# Patient Record
Sex: MALE | Race: WHITE | URBAN - METROPOLITAN AREA
[De-identification: names, ages, dates, MRNs, and addresses within clinical notes are randomized per-mention and may not be internally consistent; named-entity substitution may affect disease eponyms.]

---

## 2021-07-01 NOTE — PROGRESS NOTES
Chief Complaint   Patient presents with    Establish Care         HPI:  Elder Juan is a 41 y.o. (: 1975) here today to establish care. He would like to discuss his previously diagnosed issues: ED and anxiety. ED  Rx: viagra 20-40 mg PRN  Still happening but is stressed. Saw a urologist; low sperm ct and infertile  Testosterone was ok in 2018  3x/week strength training, not a lot of cardio, but does walk    FRANKLIN  Rx: buspar 5 mg TID  Was originally stared Lexapro but worsened ED.  2-3 yrs ago tried Zoloft but SEs (GI and dizziness) were terrible. Stable but feels like he could be better. Still feels bouts of anxiety and irritability  Started it May 2020 and has not followed up. Sleeping alright but goes through phases. Appetite is ok. Silent migraines  Rx: nothing  Chronic but controlled per pt. Review of Systems   Constitutional: Negative for appetite change, chills, diaphoresis, fatigue, fever and unexpected weight change. HENT: Negative for congestion, postnasal drip, rhinorrhea, sinus pressure, sneezing and sore throat. Eyes: Negative for redness, itching and visual disturbance. Respiratory: Negative for cough, chest tightness, shortness of breath and wheezing. Cardiovascular: Negative for chest pain, palpitations and leg swelling. Gastrointestinal: Negative for abdominal pain, blood in stool, constipation, diarrhea, nausea and vomiting. Endocrine: Negative for cold intolerance, heat intolerance, polydipsia and polyuria. Genitourinary: Negative for decreased urine volume and dysuria. Musculoskeletal: Negative for arthralgias, back pain, joint swelling, myalgias and neck pain. Skin: Negative for rash and wound. Allergic/Immunologic: Negative for environmental allergies. Neurological: Negative for dizziness, tremors, syncope, weakness, light-headedness, numbness and headaches. Hematological: Negative for adenopathy. Does not bruise/bleed easily. Psychiatric/Behavioral: Positive for agitation and behavioral problems. Negative for confusion, decreased concentration, self-injury, sleep disturbance and suicidal ideas. The patient is nervous/anxious. Past Medical History:   Diagnosis Date    Anxiety     ED (erectile dysfunction) 2015    Migraines 2018       Family History   Problem Relation Age of Onset    Migraines Father     Hypertension Father        Social History     Tobacco Use    Smoking status: Former Smoker     Years: 8.00     Types: Cigarettes     Quit date: 2018     Years since quitting: 3.5    Smokeless tobacco: Never Used   Substance Use Topics    Alcohol use: Not on file    Drug use: Not on file       New Prescriptions    BUSPIRONE (BUSPAR) 7.5 MG TABLET    Take 1 tablet by mouth 3 times daily       Meds Prior to visit:  Current Outpatient Medications on File Prior to Visit   Medication Sig Dispense Refill    Sildenafil Citrate (VIAGRA PO) Take by mouth       No current facility-administered medications on file prior to visit. No Known Allergies    OBJECTIVE:  /82   Pulse 74   Temp 99.1 °F (37.3 °C) (Temporal)   Resp 16   Ht 5' 11.5\" (1.816 m)   Wt 194 lb (88 kg)   BMI 26.68 kg/m²   BP Readings from Last 2 Encounters:   07/06/21 130/82     Wt Readings from Last 3 Encounters:   07/06/21 194 lb (88 kg)       Physical Exam  Vitals reviewed. Constitutional:       General: He is not in acute distress. Appearance: Normal appearance. He is well-developed and normal weight. He is not ill-appearing. HENT:      Head: Normocephalic and atraumatic. Right Ear: Tympanic membrane, ear canal and external ear normal. There is no impacted cerumen. Left Ear: Tympanic membrane, ear canal and external ear normal. There is no impacted cerumen. Nose: Nose normal. No congestion or rhinorrhea. Mouth/Throat:      Mouth: Mucous membranes are moist.      Pharynx: Oropharynx is clear.  No oropharyngeal exudate or Judgment normal.      Comments:           ASSESSMENT/PLAN:  1. Encounter to establish care  VS reviewed and WNL    BMI reviewed   All questions answered. F/u discussed. Healthy lifestyle modifications discussed. 2. FRANKLIN (generalized anxiety disorder)  Seems like he can do better. Has not titrate up on dose  Will do this and follow up in 4-6 weeks. Also offered [facility] and he will pursue if needed. Update tsh and test  - busPIRone (BUSPAR) 7.5 MG tablet; Take 1 tablet by mouth 3 times daily  Dispense: 90 tablet; Refill: 3  - Testosterone  - TSH with Reflex; Future    3. Other male erectile dysfunction  Will update test level since it was ok in 2018 but he is experiencing irritability and anger much easier  May be increased stress  - Testosterone  - TSH with Reflex; Future    4. Other migraine without status migrainosus, not intractable  No meds  Controlled   Update thyroid function   - TSH with Reflex; Future    Discussed use, benefit, and side effects of prescribed medications. Barriers to medication compliance addressed. All patient questions answered. Pt voiced understanding. RTC Return in about 6 weeks (around 8/17/2021), or if symptoms worsen or fail to improve.     Future Appointments   Date Time Provider Monico Maciel   8/16/2021  8:00 AM MD Romel Salcedo MD  7/6/2021  9:06 AM

## 2021-07-06 ENCOUNTER — OFFICE VISIT (OUTPATIENT)
Dept: PRIMARY CARE CLINIC | Age: 46
End: 2021-07-06
Payer: OTHER GOVERNMENT

## 2021-07-06 VITALS
SYSTOLIC BLOOD PRESSURE: 130 MMHG | TEMPERATURE: 99.1 F | WEIGHT: 194 LBS | HEART RATE: 74 BPM | BODY MASS INDEX: 26.28 KG/M2 | HEIGHT: 72 IN | RESPIRATION RATE: 16 BRPM | DIASTOLIC BLOOD PRESSURE: 82 MMHG

## 2021-07-06 DIAGNOSIS — G43.809 OTHER MIGRAINE WITHOUT STATUS MIGRAINOSUS, NOT INTRACTABLE: ICD-10-CM

## 2021-07-06 DIAGNOSIS — N52.8 OTHER MALE ERECTILE DYSFUNCTION: ICD-10-CM

## 2021-07-06 DIAGNOSIS — F41.1 GAD (GENERALIZED ANXIETY DISORDER): ICD-10-CM

## 2021-07-06 DIAGNOSIS — Z76.89 ENCOUNTER TO ESTABLISH CARE: Primary | ICD-10-CM

## 2021-07-06 LAB — TSH REFLEX: 1.34 UIU/ML (ref 0.27–4.2)

## 2021-07-06 PROCEDURE — 99204 OFFICE O/P NEW MOD 45 MIN: CPT | Performed by: FAMILY MEDICINE

## 2021-07-06 RX ORDER — BUSPIRONE HYDROCHLORIDE 7.5 MG/1
7.5 TABLET ORAL 3 TIMES DAILY
Qty: 90 TABLET | Refills: 3 | Status: SHIPPED | OUTPATIENT
Start: 2021-07-06 | End: 2021-11-23

## 2021-07-06 RX ORDER — BUSPIRONE HYDROCHLORIDE 15 MG/1
15 TABLET ORAL 3 TIMES DAILY
COMMUNITY
End: 2021-07-06

## 2021-07-06 ASSESSMENT — PATIENT HEALTH QUESTIONNAIRE - PHQ9
2. FEELING DOWN, DEPRESSED OR HOPELESS: 0
SUM OF ALL RESPONSES TO PHQ QUESTIONS 1-9: 0
1. LITTLE INTEREST OR PLEASURE IN DOING THINGS: 0
DEPRESSION UNABLE TO ASSESS: FUNCTIONAL CAPACITY MOTIVATION LIMITS ACCURACY
SUM OF ALL RESPONSES TO PHQ QUESTIONS 1-9: 0
SUM OF ALL RESPONSES TO PHQ QUESTIONS 1-9: 0
SUM OF ALL RESPONSES TO PHQ9 QUESTIONS 1 & 2: 0

## 2021-07-06 ASSESSMENT — ENCOUNTER SYMPTOMS
CONSTIPATION: 0
EYE ITCHING: 0
ABDOMINAL PAIN: 0
SINUS PRESSURE: 0
EYE REDNESS: 0
VOMITING: 0
BLOOD IN STOOL: 0
WHEEZING: 0
SORE THROAT: 0
BACK PAIN: 0
COUGH: 0
NAUSEA: 0
RHINORRHEA: 0
SHORTNESS OF BREATH: 0
CHEST TIGHTNESS: 0
DIARRHEA: 0

## 2021-07-08 LAB — TESTOSTERONE TOTAL: 307 NG/DL (ref 220–1000)

## 2021-07-14 DIAGNOSIS — R68.82 LIBIDO, DECREASED: ICD-10-CM

## 2021-07-14 DIAGNOSIS — N52.8 OTHER MALE ERECTILE DYSFUNCTION: Primary | ICD-10-CM

## 2021-08-14 NOTE — PROGRESS NOTES
Chief Complaint   Patient presents with    Anxiety         HPI:  Yasmeen Harry is a 41 y.o. (: 1975) here today for FRANKLIN. LOV increased buspar. Since then he states he believes it is making his anxiety better but he is not sure. He is fine continuing this dose and working on his anxiety by having reasonable expectations. No interest in [facility] at this time. ED  Checked testosterone LOV and it was normal.   Uro referral placed per pt request LOV  Has an appt with uro next week. Currently taking viagra and this helps    Vivid dreams  Happening about every 2 weeks with the increase in buspar. He has researched this in depth online and feels that it is likely due to the medication. He doesn't think it is affecting him enough to stop the medication however. Brought in his lab results from work physical.   LDL and total cholesterol are mildly elevated. Glucose at 102. Discussed diabetes screen and he politely declined. Talked about animal proteins and veggies. Review of Systems   Constitutional: Negative for activity change, chills, fatigue and fever. HENT: Negative for congestion. Eyes: Negative for redness. Respiratory: Negative for cough, chest tightness, shortness of breath and wheezing. Cardiovascular: Negative for chest pain and palpitations. Gastrointestinal: Negative for abdominal pain and diarrhea. Genitourinary: Negative for difficulty urinating. Musculoskeletal: Negative for arthralgias. Skin: Negative for rash. Allergic/Immunologic: Negative for immunocompromised state. Neurological: Negative for dizziness, light-headedness and headaches. Hematological: Negative for adenopathy. Psychiatric/Behavioral: Negative for behavioral problems.        Past Medical History:   Diagnosis Date    Anxiety     ED (erectile dysfunction)     Migraines 2018       Family History   Problem Relation Age of Onset    Migraines Father     Hypertension Father Social History     Tobacco Use    Smoking status: Former Smoker     Years: 8.00     Types: Cigarettes     Quit date: 2018     Years since quitting: 3.6    Smokeless tobacco: Never Used   Substance Use Topics    Alcohol use: Not on file    Drug use: Not on file       New Prescriptions    No medications on file       Meds Prior to visit:  Current Outpatient Medications on File Prior to Visit   Medication Sig Dispense Refill    busPIRone (BUSPAR) 7.5 MG tablet Take 7.5 mg by mouth 3 times daily      Sildenafil Citrate (VIAGRA PO) Take by mouth       No current facility-administered medications on file prior to visit. No Known Allergies    OBJECTIVE:  /79   Pulse 56   Temp 97.8 °F (36.6 °C) (Temporal)   Resp 16   Wt 195 lb 9.6 oz (88.7 kg)   BMI 26.90 kg/m²   BP Readings from Last 2 Encounters:   08/16/21 129/79   07/06/21 130/82     Wt Readings from Last 3 Encounters:   08/16/21 195 lb 9.6 oz (88.7 kg)   07/06/21 194 lb (88 kg)       Physical Exam  Vitals and nursing note reviewed. Constitutional:       General: He is not in acute distress. Appearance: Normal appearance. HENT:      Head: Normocephalic and atraumatic. Right Ear: Tympanic membrane, ear canal and external ear normal. There is no impacted cerumen. Left Ear: Tympanic membrane, ear canal and external ear normal. There is no impacted cerumen. Nose: Nose normal.      Mouth/Throat:      Mouth: Mucous membranes are moist.      Pharynx: Oropharynx is clear. Eyes:      Extraocular Movements: Extraocular movements intact. Conjunctiva/sclera: Conjunctivae normal.      Pupils: Pupils are equal, round, and reactive to light. Cardiovascular:      Rate and Rhythm: Normal rate and regular rhythm. Pulses: Normal pulses. Heart sounds: Normal heart sounds. Pulmonary:      Effort: Pulmonary effort is normal. No respiratory distress. Breath sounds: Normal breath sounds. No wheezing.    Abdominal: General: Bowel sounds are normal.   Musculoskeletal:         General: No signs of injury. Normal range of motion. Cervical back: Normal range of motion. Skin:     General: Skin is warm. Capillary Refill: Capillary refill takes less than 2 seconds. Findings: No erythema. Neurological:      General: No focal deficit present. Mental Status: He is alert and oriented to person, place, and time. Mental status is at baseline. Psychiatric:         Mood and Affect: Mood normal.         Behavior: Behavior normal.         Thought Content: Thought content normal.         Judgment: Judgment normal.           ASSESSMENT/PLAN:  1. FRANKLIN (generalized anxiety disorder)  Continue buspar for now  Will follow up in 4-6 weeks to gauge any more improvement or worsening of dreams if the med is the cause  No new life stressors    2. Other male erectile dysfunction  Has uro follow up next week  Testosterone is normal  Libido is still decreased but he feel that his anxiety had always caused this versus the medication  Consider wellbutrin addition in the future    3. Vivid dream  Stable and will continue to monitor for patient'follow up in 4-6 weeks to gauge improvement  Not interested in [facility] at this time    4. Mixed hyperlipidemia  Per outside labs now in media  Discussed dietary mods to help make these numbers better  Will work on increasing activity 3 times a week for 30 minutes. I have spent 30 minutes of face to face time with the patient with more than 50%  spent counseling , educating and coordinating care for HLD, FRANKLIN, ED and dreams. Discussed use, benefit, and side effects of prescribed medications. Barriers to medication compliance addressed. All patient questions answered. Pt voiced understanding. RTC Return in about 3 months (around 11/16/2021).     Future Appointments   Date Time Provider Monico Maciel   11/16/2021  8:00 AM MD KATE Null Susan Flores MD  8/16/2021  9:55 AM

## 2021-08-16 ENCOUNTER — OFFICE VISIT (OUTPATIENT)
Dept: PRIMARY CARE CLINIC | Age: 46
End: 2021-08-16
Payer: OTHER GOVERNMENT

## 2021-08-16 VITALS
SYSTOLIC BLOOD PRESSURE: 129 MMHG | DIASTOLIC BLOOD PRESSURE: 79 MMHG | TEMPERATURE: 97.8 F | WEIGHT: 195.6 LBS | RESPIRATION RATE: 16 BRPM | HEART RATE: 56 BPM | BODY MASS INDEX: 26.9 KG/M2

## 2021-08-16 DIAGNOSIS — R68.89 VIVID DREAM: ICD-10-CM

## 2021-08-16 DIAGNOSIS — N52.8 OTHER MALE ERECTILE DYSFUNCTION: ICD-10-CM

## 2021-08-16 DIAGNOSIS — F41.1 GAD (GENERALIZED ANXIETY DISORDER): Primary | ICD-10-CM

## 2021-08-16 DIAGNOSIS — E78.2 MIXED HYPERLIPIDEMIA: ICD-10-CM

## 2021-08-16 PROCEDURE — 99214 OFFICE O/P EST MOD 30 MIN: CPT | Performed by: FAMILY MEDICINE

## 2021-08-16 RX ORDER — BUSPIRONE HYDROCHLORIDE 7.5 MG/1
7.5 TABLET ORAL 3 TIMES DAILY
COMMUNITY
End: 2021-11-16

## 2021-08-16 ASSESSMENT — ENCOUNTER SYMPTOMS
WHEEZING: 0
SHORTNESS OF BREATH: 0
COUGH: 0
DIARRHEA: 0
CHEST TIGHTNESS: 0
EYE REDNESS: 0
ABDOMINAL PAIN: 0

## 2021-09-07 ENCOUNTER — OFFICE VISIT (OUTPATIENT)
Dept: PRIMARY CARE CLINIC | Age: 46
End: 2021-09-07
Payer: OTHER GOVERNMENT

## 2021-09-07 VITALS
HEART RATE: 61 BPM | TEMPERATURE: 98.1 F | SYSTOLIC BLOOD PRESSURE: 133 MMHG | BODY MASS INDEX: 26.82 KG/M2 | DIASTOLIC BLOOD PRESSURE: 84 MMHG | WEIGHT: 195 LBS

## 2021-09-07 DIAGNOSIS — K11.5 SALIVARY STONE: Primary | ICD-10-CM

## 2021-09-07 PROCEDURE — 99213 OFFICE O/P EST LOW 20 MIN: CPT | Performed by: STUDENT IN AN ORGANIZED HEALTH CARE EDUCATION/TRAINING PROGRAM

## 2021-09-07 ASSESSMENT — ENCOUNTER SYMPTOMS
NAUSEA: 0
COUGH: 0
SORE THROAT: 0
TROUBLE SWALLOWING: 0
CHOKING: 0

## 2021-09-07 NOTE — PROGRESS NOTES
[facility]  Acute visit   2021    Rohan Jones (:  1975) is a 41 y.o. male, here for evaluation of the following medical concerns:    Chief Complaint   Patient presents with    Other     under tounge swollen           ASSESSMENT/ PLAN:  1. Salivary stone  No infected, will watch and wait   Consider ENT referral if does not resolve  Recommended sucking on hard candy, hydration   Monitor s/s infection     Return if symptoms worsen or fail to improve. HPI  Hard lump under tongue for the past 4 weeks. Notes possible dehydration at onset. No medication changes. Not painful, resolving overall     ROS  Review of Systems   Constitutional: Negative for appetite change and fever. HENT: Negative for dental problem, drooling, mouth sores, sore throat and trouble swallowing. Respiratory: Negative for cough and choking. Gastrointestinal: Negative for nausea. Skin: Negative for rash and wound. HISTORIES  Current Outpatient Medications on File Prior to Visit   Medication Sig Dispense Refill    busPIRone (BUSPAR) 7.5 MG tablet Take 7.5 mg by mouth 3 times daily      Sildenafil Citrate (VIAGRA PO) Take by mouth       No current facility-administered medications on file prior to visit. No Known Allergies  Past Medical History:   Diagnosis Date    Anxiety     ED (erectile dysfunction) 2015    Migraines      There is no problem list on file for this patient.     Past Surgical History:   Procedure Laterality Date    CIRCUMCISION  2012    WISDOM TOOTH EXTRACTION       Social History     Socioeconomic History    Marital status:      Spouse name: Not on file    Number of children: Not on file    Years of education: Not on file    Highest education level: Not on file   Occupational History    Not on file   Tobacco Use    Smoking status: Former Smoker     Years: 8.00     Types: Cigarettes     Quit date: 2018     Years since quitting: 3.6    Smokeless tobacco: Never Used   Substance and Sexual Activity    Alcohol use: Not on file    Drug use: Not on file    Sexual activity: Not on file   Other Topics Concern    Not on file   Social History Narrative    Not on file     Social Determinants of Health     Financial Resource Strain:     Difficulty of Paying Living Expenses:    Food Insecurity:     Worried About Running Out of Food in the Last Year:     [address] in the Last Year:    Transportation Needs:     Lack of Transportation (Medical):  Lack of Transportation (Non-Medical):    Physical Activity:     Days of Exercise per Week:     Minutes of Exercise per Session:    Stress:     Feeling of Stress :    Social Connections:     Frequency of Communication with Friends and Family:     Frequency of Social Gatherings with Friends and Family:     Attends Faith Services:     Active Member of Clubs or Organizations:     Attends Club or Organization Meetings:     Marital Status:    Intimate Partner Violence:     Fear of Current or Ex-Partner:     Emotionally Abused:     Physically Abused:     Sexually Abused:       Family History   Problem Relation Age of Onset    Migraines Father     Hypertension Father        PE  Vitals:    09/07/21 0907   BP: 133/84   Pulse: 61   Temp: 98.1 °F (36.7 °C)   TempSrc: Oral   Weight: 195 lb (88.5 kg)     Estimated body mass index is 26.82 kg/m² as calculated from the following:    Height as of 7/6/21: 5' 11.5\" (1.816 m). Weight as of this encounter: 195 lb (88.5 kg). Physical Exam  Vitals and nursing note reviewed. Constitutional:       General: He is not in acute distress. Appearance: Normal appearance. HENT:      Nose: Nose normal.      Mouth/Throat:      Mouth: Mucous membranes are moist.      Pharynx: Oropharynx is clear. Comments: Firm 1 cm nodule within salivary gland, mobile.  No swelling, non -tender, no discharge   Eyes:      Conjunctiva/sclera: Conjunctivae normal.      Pupils: Pupils are equal, round, and reactive to light. Musculoskeletal:      Cervical back: Normal range of motion. Lymphadenopathy:      Cervical: No cervical adenopathy. Neurological:      Mental Status: He is alert. Psychiatric:         Mood and Affect: Mood normal.         Behavior: Behavior normal.             Melly Martinez DO    This dictation was generated by voice recognition computer software. Although all attempts are made to edit the dictation for accuracy, there may be errors in the transcription that are not intended.

## 2021-09-07 NOTE — PATIENT INSTRUCTIONS
Patient Education        Salivary Gland Stone: Care Instructions  Your Care Instructions     Salivary glands make saliva, or spit. A salivary gland stone is a piece of hard material, usually calcium, that can form in any of the three main salivary glands in the mouth. Salivary gland stones are also called salivary duct stones. Stones form most often in the gland that releases saliva below the tongue. A stone can block saliva from flowing out of the gland. When saliva backs up behind the stone, it can make the gland swell. The gland swells while you are eating, and then the swelling goes down slowly afterward. The swelling and pain may be under the jaw or in the area in front of the ear, depending on which gland is affected. Your doctor will ask you about your symptoms. He or she may be able to see and feel the gland under your skin or in the floor of your mouth. You may get an imaging test, such as a CT scan or ultrasound. This will help your doctor know if you have a stone and not some other problem. Most stones come out into the mouth on their own. While the stone is in the gland, your doctor may have you take medicine for pain. There are also some things you can do at home to help move the stone. If the stone in your gland hasn't come out within a few weeks, your doctor will discuss treatment options with you. Follow-up care is a key part of your treatment and safety. Be sure to make and go to all appointments, and call your doctor if you are having problems. It's also a good idea to know your test results and keep a list of the medicines you take. How can you care for yourself at home? · Be safe with medicines. Read and follow all instructions on the label. ? If the doctor gave you a prescription medicine for pain, take it as prescribed. ? If you are not taking a prescription pain medicine, ask your doctor if you can take an over-the-counter medicine.   · If your doctor prescribed antibiotics, take them as directed. Do not stop taking them just because you feel better. You need to take the full course of antibiotics. · Use sugar-free gum or candies such as lemon drops, or suck on a lemon wedge. They increase saliva, which may help push the stone out. · Gently massage the affected gland to help move the stone. When should you call for help? Call your doctor now or seek immediate medical care if:    · You have symptoms of infection, such as:  ? Increased pain, swelling, warmth, or redness. ? Red streaks leading from the salivary gland. ? Pus draining from the area where the saliva comes out into the mouth. ? A fever. Watch closely for changes in your health, and be sure to contact your doctor if:    · You do not get better as expected. Where can you learn more? Go to [email or web address]://[email or web address].[email or web address]. org and sign in to your [employer] account. Enter C045 in the Role[employer] box to learn more about \"Salivary Gland Stone: Care Instructions. \"     If you do not have an account, please click on the \"Sign Up Now\" link. Current as of: December 2, 2020               Content Version: 12.9  © 3664-5105 Healthwise, Incorporated. Care instructions adapted under license by [facility] ([facility]). If you have questions about a medical condition or this instruction, always ask your healthcare professional. [address] any warranty or liability for your use of this information.

## 2021-11-15 NOTE — PROGRESS NOTES
Chief Complaint   Patient presents with    Anxiety         ASSESSMENT/PLAN:  1. FRANKLIN (generalized anxiety disorder)  Updated FRANKLIN and interpreted  Patient feels that he is not controlled on BuSpar and would like to try something else  Also feeling quite groggy on it  We will start Wellbutrin  mg daily and follow-up in 4 to 6 weeks to gauge improvement  Politely declines behavioral health at this time  - [place] BEHAV ASSMT W/SCORE & DOCD/STAND INSTRUMENT  - buPROPion (WELLBUTRIN XL) 150 MG extended release tablet; Take 1 tablet by mouth every morning  Dispense: 30 tablet; Refill: 3    2. Other male erectile dysfunction  Following with urology  Continue testosterone    3. Vivid dream  Seems to believe this is caused by BuSpar  We will stop BuSpar and start Wellbutrin  Follow-up in 4 to 6 weeks to gauge improvement  Offered trazodone but politely declines  May try in the future         HPI:  Dell Ortiz is a 41 y.o. (: 1975) here today for follow up of anxiety and erectile dysfunction. Buspar is not working for him and causing grogginess. Tried Zoloft and had horrible diarrhea  Tried Lexapro decreased his libido    Open to trying something new. Very worried about his relationship with his wife and libido. Also having vivid dream, waking up screaming a couple times a month. FRANKLIN 7 SCORE 2021   FRANKLIN-7 Total Score 9   Interpretation of RFANKLIN-7 score: 5-9 = mild anxiety, 10-14 = moderate anxiety, 15+ = severe anxiety. Recommend referral to behavioral health for scores 10 or greater. On topical T for decreased libido and doing ok. Following with urology group. Review of Systems   Constitutional: Negative for activity change, chills, fatigue and fever. HENT: Negative for congestion. Eyes: Negative for redness. Respiratory: Negative for cough, chest tightness, shortness of breath and wheezing. Cardiovascular: Negative for chest pain and palpitations.    Gastrointestinal: Negative for abdominal pain and diarrhea. Genitourinary: Negative for difficulty urinating. Musculoskeletal: Negative for arthralgias. Skin: Negative for rash. Allergic/Immunologic: Negative for immunocompromised state. Neurological: Negative for dizziness, light-headedness and headaches. Hematological: Negative for adenopathy. Psychiatric/Behavioral: Positive for sleep disturbance. Negative for behavioral problems. The patient is nervous/anxious. Past Medical History:   Diagnosis Date    Anxiety     ED (erectile dysfunction) 2015    Migraines 2018       Family History   Problem Relation Age of Onset    Migraines Father     Hypertension Father        Social History     Tobacco Use    Smoking status: Former Smoker     Years: 8.00     Types: Cigarettes     Quit date: 2018     Years since quitting: 3.8    Smokeless tobacco: Never Used   Substance Use Topics    Alcohol use: Not on file    Drug use: Not on file       New Prescriptions    BUPROPION (WELLBUTRIN XL) 150 MG EXTENDED RELEASE TABLET    Take 1 tablet by mouth every morning       Meds Prior to visit:  Current Outpatient Medications on File Prior to Visit   Medication Sig Dispense Refill    Sildenafil Citrate (VIAGRA PO) Take by mouth      testosterone (ANDROGEL; TESTIM) 50 MG/5GM (1%) GEL 1% gel Apply TWO milliliters TO underarm, upper arm, OR shoulder ONCE daily AFTER showering. [facility] immediately AFTER applying. No current facility-administered medications on file prior to visit. No Known Allergies    OBJECTIVE:  /83   Pulse 59   Temp 98.1 °F (36.7 °C) (Temporal)   Resp 16   Wt 198 lb 6.4 oz (90 kg)   BMI 27.29 kg/m²   BP Readings from Last 2 Encounters:   11/16/21 135/83   09/07/21 133/84     Wt Readings from Last 3 Encounters:   11/16/21 198 lb 6.4 oz (90 kg)   09/07/21 195 lb (88.5 kg)   08/16/21 195 lb 9.6 oz (88.7 kg)       Physical Exam  Vitals and nursing note reviewed.    Constitutional:       General: He

## 2021-11-16 ENCOUNTER — OFFICE VISIT (OUTPATIENT)
Dept: PRIMARY CARE CLINIC | Age: 46
End: 2021-11-16
Payer: OTHER GOVERNMENT

## 2021-11-16 VITALS
BODY MASS INDEX: 27.29 KG/M2 | TEMPERATURE: 98.1 F | SYSTOLIC BLOOD PRESSURE: 135 MMHG | DIASTOLIC BLOOD PRESSURE: 83 MMHG | HEART RATE: 59 BPM | WEIGHT: 198.4 LBS | RESPIRATION RATE: 16 BRPM

## 2021-11-16 DIAGNOSIS — F41.1 GAD (GENERALIZED ANXIETY DISORDER): Primary | ICD-10-CM

## 2021-11-16 DIAGNOSIS — N52.8 OTHER MALE ERECTILE DYSFUNCTION: ICD-10-CM

## 2021-11-16 DIAGNOSIS — R68.89 VIVID DREAM: ICD-10-CM

## 2021-11-16 PROCEDURE — 99214 OFFICE O/P EST MOD 30 MIN: CPT | Performed by: FAMILY MEDICINE

## 2021-11-16 PROCEDURE — 96127 BRIEF EMOTIONAL/BEHAV ASSMT: CPT | Performed by: FAMILY MEDICINE

## 2021-11-16 RX ORDER — TESTOSTERONE GEL, 1% 10 MG/G
GEL TRANSDERMAL
COMMUNITY
Start: 2021-10-19

## 2021-11-16 RX ORDER — BUPROPION HYDROCHLORIDE 150 MG/1
150 TABLET ORAL EVERY MORNING
Qty: 30 TABLET | Refills: 3 | Status: SHIPPED | OUTPATIENT
Start: 2021-11-16 | End: 2021-12-08

## 2021-11-16 SDOH — ECONOMIC STABILITY: FOOD INSECURITY: WITHIN THE PAST 12 MONTHS, THE FOOD YOU BOUGHT JUST DIDN'T LAST AND YOU DIDN'T HAVE MONEY TO GET MORE.: NEVER TRUE

## 2021-11-16 SDOH — ECONOMIC STABILITY: FOOD INSECURITY: WITHIN THE PAST 12 MONTHS, YOU WORRIED THAT YOUR FOOD WOULD RUN OUT BEFORE YOU GOT MONEY TO BUY MORE.: NEVER TRUE

## 2021-11-16 ASSESSMENT — ANXIETY QUESTIONNAIRES
6. BECOMING EASILY ANNOYED OR IRRITABLE: 2-OVER HALF THE DAYS
5. BEING SO RESTLESS THAT IT IS HARD TO SIT STILL: 1-SEVERAL DAYS
2. NOT BEING ABLE TO STOP OR CONTROL WORRYING: 1-SEVERAL DAYS
1. FEELING NERVOUS, ANXIOUS, OR ON EDGE: 2-OVER HALF THE DAYS
4. TROUBLE RELAXING: 1-SEVERAL DAYS
7. FEELING AFRAID AS IF SOMETHING AWFUL MIGHT HAPPEN: 1-SEVERAL DAYS
GAD7 TOTAL SCORE: 9
3. WORRYING TOO MUCH ABOUT DIFFERENT THINGS: 1-SEVERAL DAYS

## 2021-11-16 ASSESSMENT — ENCOUNTER SYMPTOMS
DIARRHEA: 0
CHEST TIGHTNESS: 0
SHORTNESS OF BREATH: 0
ABDOMINAL PAIN: 0
WHEEZING: 0
EYE REDNESS: 0
COUGH: 0

## 2021-11-16 ASSESSMENT — SOCIAL DETERMINANTS OF HEALTH (SDOH): HOW HARD IS IT FOR YOU TO PAY FOR THE VERY BASICS LIKE FOOD, HOUSING, MEDICAL CARE, AND HEATING?: NOT HARD AT ALL

## 2021-11-23 DIAGNOSIS — F41.1 GAD (GENERALIZED ANXIETY DISORDER): ICD-10-CM

## 2021-11-23 RX ORDER — BUSPIRONE HYDROCHLORIDE 7.5 MG/1
TABLET ORAL
Qty: 270 TABLET | Refills: 1 | Status: SHIPPED | OUTPATIENT
Start: 2021-11-23 | End: 2022-01-28

## 2021-11-23 NOTE — TELEPHONE ENCOUNTER
Medication:   Requested Prescriptions     Pending Prescriptions Disp Refills    busPIRone (BUSPAR) 7.5 MG tablet [Pharmacy Med Name: BUSPIRONE HCL 7.5 MG TABLET] 270 tablet 1     Sig: TAKE 1 TABLET BY MOUTH THREE TIMES A DAY        Last Filled:      Patient Phone Number: [phone] (home)     Last appt: 11/16/2021   Next appt: 12/15/2021    Last OARRS: No flowsheet data found.

## 2021-12-08 DIAGNOSIS — F41.1 GAD (GENERALIZED ANXIETY DISORDER): ICD-10-CM

## 2021-12-08 RX ORDER — BUPROPION HYDROCHLORIDE 150 MG/1
TABLET ORAL
Qty: 30 TABLET | Refills: 3 | Status: SHIPPED | OUTPATIENT
Start: 2021-12-08 | End: 2022-01-28 | Stop reason: SDUPTHER

## 2021-12-08 NOTE — TELEPHONE ENCOUNTER
Medication:   Requested Prescriptions     Pending Prescriptions Disp Refills    buPROPion (WELLBUTRIN XL) 150 MG extended release tablet [Pharmacy Med Name: BUPROPION HCL  MG TABLET] 30 tablet 3     Sig: TAKE 1 TABLET BY MOUTH EVERY DAY IN THE MORNING        Last Filled:      Patient Phone Number: [phone] (home)     Last appt: 11/16/2021   Next appt: 12/15/2021    Last OARRS: No flowsheet data found.

## 2022-01-28 ENCOUNTER — OFFICE VISIT (OUTPATIENT)
Dept: PRIMARY CARE CLINIC | Age: 47
End: 2022-01-28
Payer: COMMERCIAL

## 2022-01-28 VITALS
SYSTOLIC BLOOD PRESSURE: 136 MMHG | RESPIRATION RATE: 16 BRPM | TEMPERATURE: 99.1 F | DIASTOLIC BLOOD PRESSURE: 87 MMHG | WEIGHT: 200.8 LBS | HEART RATE: 94 BPM | BODY MASS INDEX: 27.62 KG/M2

## 2022-01-28 DIAGNOSIS — Z13.220 LIPID SCREENING: ICD-10-CM

## 2022-01-28 DIAGNOSIS — R68.89 VIVID DREAM: ICD-10-CM

## 2022-01-28 DIAGNOSIS — Z13.1 DIABETES MELLITUS SCREENING: ICD-10-CM

## 2022-01-28 DIAGNOSIS — G43.809 OTHER MIGRAINE WITHOUT STATUS MIGRAINOSUS, NOT INTRACTABLE: ICD-10-CM

## 2022-01-28 DIAGNOSIS — F41.1 GAD (GENERALIZED ANXIETY DISORDER): Primary | ICD-10-CM

## 2022-01-28 DIAGNOSIS — N52.8 OTHER MALE ERECTILE DYSFUNCTION: ICD-10-CM

## 2022-01-28 PROCEDURE — 96127 BRIEF EMOTIONAL/BEHAV ASSMT: CPT | Performed by: FAMILY MEDICINE

## 2022-01-28 PROCEDURE — 99214 OFFICE O/P EST MOD 30 MIN: CPT | Performed by: FAMILY MEDICINE

## 2022-01-28 RX ORDER — BUPROPION HYDROCHLORIDE 300 MG/1
TABLET ORAL
Qty: 90 TABLET | Refills: 3 | Status: SHIPPED | OUTPATIENT
Start: 2022-01-28

## 2022-01-28 ASSESSMENT — ENCOUNTER SYMPTOMS
EYE REDNESS: 0
DIARRHEA: 0
ABDOMINAL PAIN: 0
COUGH: 0
SHORTNESS OF BREATH: 0
WHEEZING: 0
CHEST TIGHTNESS: 0

## 2022-01-28 ASSESSMENT — ANXIETY QUESTIONNAIRES
1. FEELING NERVOUS, ANXIOUS, OR ON EDGE: 1
2. NOT BEING ABLE TO STOP OR CONTROL WORRYING: 0
7. FEELING AFRAID AS IF SOMETHING AWFUL MIGHT HAPPEN: 0
3. WORRYING TOO MUCH ABOUT DIFFERENT THINGS: 1
GAD7 TOTAL SCORE: 4
4. TROUBLE RELAXING: 0
5. BEING SO RESTLESS THAT IT IS HARD TO SIT STILL: 0
IF YOU CHECKED OFF ANY PROBLEMS ON THIS QUESTIONNAIRE, HOW DIFFICULT HAVE THESE PROBLEMS MADE IT FOR YOU TO DO YOUR WORK, TAKE CARE OF THINGS AT HOME, OR GET ALONG WITH OTHER PEOPLE: SOMEWHAT DIFFICULT
6. BECOMING EASILY ANNOYED OR IRRITABLE: 2

## 2022-01-28 NOTE — PROGRESS NOTES
Chief Complaint   Patient presents with    Follow-up    Medication Check         ASSESSMENT/PLAN:  1. FRANKLIN (generalized anxiety disorder)  Updated FRANKLIN and interpreted  Patient feels that he more controlled on Wellbutrin and would like to try an increased dose. Mentioned ringing in his ears but this has resolved. Follow up in 4 to 6 weeks to gauge improvement  Politely declines behavioral health at this time  Update labs and rule thyroid abnormality  - MT BEHAV ASSMT W/SCORE & DOCD/STAND INSTRUMENT  - buPROPion (WELLBUTRIN XL) 300 MG extended release tablet; TAKE 1 TABLET BY MOUTH EVERY DAY IN THE MORNING  Dispense: 90 tablet; Refill: 3  - CBC Auto Differential; Future  - Comprehensive Metabolic Panel; Future  - TSH with Reflex; Future    2. Other male erectile dysfunction  Following with urology  Continue testosterone  They are following levels   States that his libido is a little better. - CBC Auto Differential; Future  - Comprehensive Metabolic Panel; Future  - TSH with Reflex; Future    3. Vivid dream  Continue wellbutrin but at increased dose. Doing better. Dreams are much less frequent  Follow-up in 4 to 6 weeks to gauge improvement    4. Other migraine without status migrainosus, not intractable  Migraines are less severe and less frequent  He believes it is due to testosterone  Will keep me posted   Follow up in 4-6 weeks to gauge improvement    5. Diabetes mellitus screening  Update and treat accordingly  - HEMOGLOBIN A1C; Future    6. Lipid screening  Update and treat accordingly  He is concerned about his ASCVD risk and would like me to risk stratify  - Lipid Panel; Future         HPI:  Eduard Brantley is a 40 y.o. (: 1975) here today for mgmt of chronic issues and blood work. Tried Zoloft and had horrible diarrhea  Tried Lexapro decreased his libido  Doing better on wellbutrin but open to increasing it.     FRANKLIN 7 SCORE 2021   FRANKLIN-7 Total Score 4 -   FRANKLIN-7 Total Score - 9 Problem: Falls - Risk of:  Goal: Will remain free from falls  Description: Will remain free from falls  8/7/2021 0843 by Zina Peck RN  Outcome: Ongoing     Problem: Falls - Risk of:  Goal: Absence of physical injury  Description: Absence of physical injury  8/7/2021 0843 by Zina Peck RN  Outcome: Ongoing     Problem: Discharge Planning:  Goal: Discharged to appropriate level of care  Description: Discharged to appropriate level of care  8/7/2021 0843 by Zina Peck RN  Outcome: Ongoing     Problem: Fluid Volume - Imbalance:  Goal: Will remain free of signs and symptoms of dehydration  Description: Will remain free of signs and symptoms of dehydration  8/7/2021 0843 by Zina Peck RN  Outcome: Ongoing     Problem: Fluid Volume - Imbalance:  Goal: Absence of imbalanced fluid volume signs and symptoms  Description: Absence of imbalanced fluid volume signs and symptoms  8/7/2021 0843 by Zina Peck RN  Outcome: Ongoing     Problem: Breathing Pattern - Ineffective  Goal: Ability to achieve and maintain a regular respiratory rate  8/7/2021 0843 by Zina Peck RN  Outcome: Ongoing     Problem: Body Temperature -  Risk of, Imbalanced  Goal: Ability to maintain a body temperature within defined limits  8/7/2021 0843 by Zina Peck RN  Outcome: Ongoing     Problem: Body Temperature -  Risk of, Imbalanced  Goal: Will regain or maintain usual level of consciousness  8/7/2021 0843 by Zina Peck RN  Outcome: Ongoing     Problem:  Body Temperature -  Risk of, Imbalanced  Goal: Complications related to the disease process, condition or treatment will be avoided or minimized  8/7/2021 0843 by Zina Peck RN  Outcome: Ongoing     Problem: Isolation Precautions - Risk of Spread of Infection  Goal: Prevent transmission of infection  8/7/2021 0843 by Zina Peck RN  Outcome: Ongoing     Problem: Loneliness or Risk for Loneliness  Goal: Demonstrate positive use of time alone when Interpretation of FRANKLIN-7 score: 5-9 = mild anxiety, 10-14 = moderate anxiety, 15+ = severe anxiety. Recommend referral to behavioral health for scores 10 or greater. Still worried about his relationship with his wife and libido which is a bit better on topical T. Following with urology group. Vivid dreams are not as frequent or severe. Neither are his migraines. Would like his blood work updated and ASCVD risk calculated. He looked up his risk at home and it made him very nervous. Review of Systems   Constitutional: Negative for activity change, chills, fatigue and fever. HENT: Negative for congestion. Eyes: Negative for redness. Respiratory: Negative for cough, chest tightness, shortness of breath and wheezing. Cardiovascular: Negative for chest pain and palpitations. Gastrointestinal: Negative for abdominal pain and diarrhea. Genitourinary: Negative for difficulty urinating. Musculoskeletal: Negative for arthralgias. Skin: Negative for rash. Allergic/Immunologic: Negative for immunocompromised state. Neurological: Negative for dizziness, light-headedness and headaches. Hematological: Negative for adenopathy. Psychiatric/Behavioral: Positive for sleep disturbance. Negative for behavioral problems. The patient is nervous/anxious.         Past Medical History:   Diagnosis Date    Anxiety     ED (erectile dysfunction) 2015    Migraines 2018       Family History   Problem Relation Age of Onset    Migraines Father     Hypertension Father        Social History     Tobacco Use    Smoking status: Former Smoker     Years: 8.00     Types: Cigarettes     Quit date:      Years since quittin.0    Smokeless tobacco: Never Used   Substance Use Topics    Alcohol use: Not on file    Drug use: Not on file       New Prescriptions    No medications on file       Meds Prior to visit:  Current Outpatient Medications on File Prior to Visit   Medication Sig Dispense Refill    socialization is not possible  8/7/2021 0843 by Malcolm Hernadez RN  Outcome: Ongoing     Problem: Fatigue  Goal: Verbalize increase energy and improved vitality  8/7/2021 0843 by Malcolm Hernadez RN  Outcome: Ongoing     Problem: Patient Education: Go to Patient Education Activity  Goal: Patient/Family Education  8/7/2021 7335 by Malcolm Hernadez RN  Outcome: Ongoing testosterone (ANDROGEL; TESTIM) 50 MG/5GM (1%) GEL 1% gel Apply TWO milliliters TO underarm, upper arm, OR shoulder ONCE daily AFTER showering. [facility] HANDS immediately AFTER applying.  Sildenafil Citrate (VIAGRA PO) Take by mouth       No current facility-administered medications on file prior to visit. No Known Allergies    OBJECTIVE:  /87   Pulse 94   Temp 99.1 °F (37.3 °C) (Temporal)   Resp 16   Wt 200 lb 12.8 oz (91.1 kg)   BMI 27.62 kg/m²   BP Readings from Last 2 Encounters:   01/28/22 136/87   11/16/21 135/83     Wt Readings from Last 3 Encounters:   01/28/22 200 lb 12.8 oz (91.1 kg)   11/16/21 198 lb 6.4 oz (90 kg)   09/07/21 195 lb (88.5 kg)       Physical Exam  Vitals and nursing note reviewed. Constitutional:       General: He is not in acute distress. Appearance: Normal appearance. HENT:      Head: Normocephalic and atraumatic. Right Ear: External ear normal.      Left Ear: External ear normal.      Nose: Nose normal.      Mouth/Throat:      Mouth: Mucous membranes are moist.      Pharynx: Oropharynx is clear. Eyes:      Extraocular Movements: Extraocular movements intact. Conjunctiva/sclera: Conjunctivae normal.      Pupils: Pupils are equal, round, and reactive to light. Cardiovascular:      Rate and Rhythm: Normal rate and regular rhythm. Pulses: Normal pulses. Heart sounds: Normal heart sounds. Pulmonary:      Effort: Pulmonary effort is normal. No respiratory distress. Breath sounds: Normal breath sounds. No wheezing. Abdominal:      General: Bowel sounds are normal.   Musculoskeletal:         General: No signs of injury. Normal range of motion. Cervical back: Normal range of motion. Skin:     General: Skin is warm. Capillary Refill: Capillary refill takes less than 2 seconds. Findings: No erythema. Neurological:      General: No focal deficit present.       Mental Status: He is alert and oriented to person, place, and time. Mental status is at baseline. Psychiatric:         Mood and Affect: Mood normal.         Behavior: Behavior normal.         Thought Content: Thought content normal.         Judgment: Judgment normal.         Lab Results   Component Value Date    HCT 46.7 11/29/2021       Discussed use, benefit, and side effects of prescribed medications. Barriers to medication compliance addressed. All patient questions answered. Pt voiced understanding. RTC Return in about 4 weeks (around 2/25/2022) for VV.     Future Appointments   Date Time Provider Monico Maciel   2/25/2022  7:30 AM MD Maurisio Dunn MD  1/28/2022  9:03 AM

## 2022-02-25 ENCOUNTER — TELEMEDICINE (OUTPATIENT)
Dept: PRIMARY CARE CLINIC | Age: 47
End: 2022-02-25
Payer: COMMERCIAL

## 2022-02-25 DIAGNOSIS — F41.1 GAD (GENERALIZED ANXIETY DISORDER): Primary | ICD-10-CM

## 2022-02-25 DIAGNOSIS — G43.809 OTHER MIGRAINE WITHOUT STATUS MIGRAINOSUS, NOT INTRACTABLE: ICD-10-CM

## 2022-02-25 PROCEDURE — 99214 OFFICE O/P EST MOD 30 MIN: CPT | Performed by: FAMILY MEDICINE

## 2022-02-25 ASSESSMENT — ENCOUNTER SYMPTOMS
EYE REDNESS: 0
WHEEZING: 0
COUGH: 0
DIARRHEA: 0
CHEST TIGHTNESS: 0
SHORTNESS OF BREATH: 0
ABDOMINAL PAIN: 0

## 2022-02-25 NOTE — PROGRESS NOTES
2022    TELEHEALTH EVALUATION -- Audio/Visual (During TAIWV-11 public health emergency)    HPI:    Jeremias Cardona (:  1975) has requested an audio/video evaluation for the following concern(s):    FRANKLIN  Rx: Wellbutrin  mg daily  He believes it is going well and he would like to continue this dose. He is having some trouble sleeping the last 2 weeks but he is attributing it to lack of exercise and poor sleep hygiene which I agree with. He has a tendency to \"look\" for SEs. FRANKLIN 7 SCORE 2021   FRANKLIN-7 Total Score 4 -   FRANKLIN-7 Total Score - 9   Interpretation of FRANKLIN-7 score: 5-9 = mild anxiety, 10-14 = moderate anxiety, 15+ = severe anxiety. Recommend referral to behavioral health for scores 10 or greater. Migraines  He believes they were due to the testosterone in Nov and their freq was lower. Still has them but they are not too bad. Not a concern anymore. Review of Systems   Constitutional: Negative for activity change, chills, fatigue and fever. HENT: Negative for congestion. Eyes: Negative for redness. Respiratory: Negative for cough, chest tightness, shortness of breath and wheezing. Cardiovascular: Negative for chest pain and palpitations. Gastrointestinal: Negative for abdominal pain and diarrhea. Genitourinary: Negative for difficulty urinating. Musculoskeletal: Negative for arthralgias. Skin: Negative for rash. Allergic/Immunologic: Negative for immunocompromised state. Neurological: Negative for dizziness, light-headedness and headaches. Hematological: Negative for adenopathy. Psychiatric/Behavioral: Positive for sleep disturbance. Negative for behavioral problems. The patient is nervous/anxious. Prior to Visit Medications    Medication Sig Taking?  Authorizing Provider   buPROPion (WELLBUTRIN XL) 300 MG extended release tablet TAKE 1 TABLET BY MOUTH EVERY DAY IN THE MORNING  Venu Lora MD   testosterone (ANDROGEL; TESTIM) 50 MG/5GM (1%) GEL 1% gel Apply TWO milliliters TO underarm, upper arm, OR shoulder ONCE daily AFTER showering. [address] HANDS immediately AFTER applying.   Historical Provider, MD   Sildenafil Citrate (VIAGRA PO) Take by mouth  Historical Provider, MD       Social History     Tobacco Use    Smoking status: Former Smoker     Years: 8.     Types: Cigarettes     Quit date: 2018     Years since quittin.1    Smokeless tobacco: Never Used   Substance Use Topics    Alcohol use: Not on file    Drug use: Not on file        No Known Allergies,   Past Medical History:   Diagnosis Date    Anxiety     ED (erectile dysfunction) 2015    Migraines 2018   ,   Past Surgical History:   Procedure Laterality Date    CIRCUMCISION  2012    WISDOM TOOTH EXTRACTION     ,   Social History     Tobacco Use    Smoking status: Former Smoker     Years: .     Types: Cigarettes     Quit date: 2018     Years since quittin.1    Smokeless tobacco: Never Used   Substance Use Topics    Alcohol use: Not on file    Drug use: Not on file   ,   Family History   Problem Relation Age of Onset    Migraines Father     Hypertension Father    ,   Immunization History   Administered Date(s) Administered    COVID-19, Pfizer Purple top, DILUTE for use, 12+ yrs, 30mcg/0.3mL dose 2021, 2021, 2021    Hepatitis A Adult (Havrix, Vaqta) 2007    Hepatitis A/Hepatitis B (Twinrix) 2007, 2007    Hepatitis B 10/12/2007    Influenza A (E6V1-61) Vaccine IM 2009    Influenza Virus Vaccine 2007, 2007, 10/04/2008, 2009, 10/07/2010, 10/01/2011, 2012, 10/04/2013, 2021    Influenza, Live, Intranasal, Quadv, (Flumist 2-49 yrs) 10/01/2014, 10/03/2015    Influenza, Quadv, IM, PF (6 mo and older Fluzone, Flulaval, Fluarix, and 3 yrs and older Afluria) 2021    MMR 2016    Meningococcal MCV4P (Menactra) 2007    Polio IPV (IPOL) 2007    Tdap (Boostrix, Adacel) 02/23/2007, 09/25/2012    Typhoid Vac, Parenteral, Other Than Acetone-killed, Dried 10/12/2007    Typhoid Vi capsular polysaccharide (Typhim VI) 09/17/2009   ,   Health Maintenance   Topic Date Due    Hepatitis C screen  Never done    HIV screen  Never done    Diabetes screen  Never done    Lipid screen  Never done    Colorectal Cancer Screen  Never done    Depression Screen  07/06/2022    DTaP/Tdap/Td vaccine (3 - Td or Tdap) 09/25/2022    Flu vaccine  Completed    COVID-19 Vaccine  Completed    Hepatitis A vaccine  Aged Out    Hepatitis B vaccine  Aged Out    Hib vaccine  Aged Out    Meningococcal (ACWY) vaccine  Aged Out    Pneumococcal 0-64 years Vaccine  Aged Out       PHYSICAL EXAMINATION:  [ INSTRUCTIONS:  \"[x]\" Indicates a positive item  \"[]\" Indicates a negative item  -- DELETE ALL ITEMS NOT EXAMINED]  [x] Alert  [x] Oriented to person/place/time    [x] No apparent distress  [] Toxic appearing    [] Face flushed appearing [x] Sclera clear  [] Lips are cyanotic      [x] Breathing appears normal  [] Appears tachypneic      [] Rash on visible skin    [x] Cranial Nerves II-XII grossly intact    [] Motor grossly intact in visible upper extremities    [] Motor grossly intact in visible lower extremities    [x] Normal Mood  [] Anxious appearing    [] Depressed appearing  [] Confused appearing      Due to this being a TeleHealth encounter, evaluation of the following organ systems is limited: Vitals/Constitutional/EENT/Resp/CV/GI//MS/Neuro/Skin/Heme-Lymph-Imm. ASSESSMENT/PLAN:  1. FRANKLIN (generalized anxiety disorder)  Stable  Will continue current dose of wellbutrin and follow up in 3 months to gauge improvement   Reviewed FRANKLIN    2. Other migraine without status migrainosus, not intractable  Stable, less frequent and severe  Usually if he is not sleeping well  Follow up in 3 months to continue to monitor or sooner if needed.        Return in about 3 months (around 5/25/2022). No future appointments. An  electronic signature was used to authenticate this note. --Jyotsna Jones MD on 2/25/2022 at 7:51 AM    {Coding Help -- Use CPT 73065-02180 with EM elements or Time rules for Office Visits. :    >20 minutes were spent on the digital evaluation and management of this patient. Pursuant to the emergency declaration under the [address] [place] [id number], [facility] waiver authority and the [employer] Act, this Virtual  Visit was conducted, with patient's consent, to reduce the patient's risk of exposure to COVID-19 and provide continuity of care for an established patient. Services were provided through a video synchronous discussion virtually to substitute for in-person clinic visit.

## 2022-03-25 DIAGNOSIS — G43.809 OTHER MIGRAINE WITHOUT STATUS MIGRAINOSUS, NOT INTRACTABLE: ICD-10-CM

## 2022-03-25 DIAGNOSIS — Z13.1 DIABETES MELLITUS SCREENING: ICD-10-CM

## 2022-03-25 DIAGNOSIS — F41.1 GAD (GENERALIZED ANXIETY DISORDER): ICD-10-CM

## 2022-03-25 DIAGNOSIS — Z13.220 LIPID SCREENING: ICD-10-CM

## 2022-03-25 DIAGNOSIS — N52.8 OTHER MALE ERECTILE DYSFUNCTION: ICD-10-CM

## 2022-03-25 LAB
A/G RATIO: 2.4 (ref 1.1–2.2)
ALBUMIN SERPL-MCNC: 4.8 G/DL (ref 3.4–5)
ALP BLD-CCNC: 87 U/L (ref 40–129)
ALT SERPL-CCNC: 25 U/L (ref 10–40)
ANION GAP SERPL CALCULATED.3IONS-SCNC: 15 MMOL/L (ref 3–16)
AST SERPL-CCNC: 20 U/L (ref 15–37)
BASOPHILS ABSOLUTE: 0 K/UL (ref 0–0.2)
BASOPHILS RELATIVE PERCENT: 0.7 %
BILIRUB SERPL-MCNC: 0.4 MG/DL (ref 0–1)
BUN BLDV-MCNC: 16 MG/DL (ref 7–20)
CALCIUM SERPL-MCNC: 9.9 MG/DL (ref 8.3–10.6)
CHLORIDE BLD-SCNC: 103 MMOL/L (ref 99–110)
CHOLESTEROL, TOTAL: 212 MG/DL (ref 0–199)
CO2: 23 MMOL/L (ref 21–32)
CREAT SERPL-MCNC: 1.4 MG/DL (ref 0.9–1.3)
EOSINOPHILS ABSOLUTE: 0 K/UL (ref 0–0.6)
EOSINOPHILS RELATIVE PERCENT: 1 %
GFR AFRICAN AMERICAN: >60
GFR NON-AFRICAN AMERICAN: 55
GLUCOSE BLD-MCNC: 97 MG/DL (ref 70–99)
HCT VFR BLD CALC: 50.4 % (ref 40.5–52.5)
HDLC SERPL-MCNC: 62 MG/DL (ref 40–60)
HEMOGLOBIN: 16.8 G/DL (ref 13.5–17.5)
LDL CHOLESTEROL CALCULATED: 136 MG/DL
LYMPHOCYTES ABSOLUTE: 1 K/UL (ref 1–5.1)
LYMPHOCYTES RELATIVE PERCENT: 27 %
MCH RBC QN AUTO: 30.4 PG (ref 26–34)
MCHC RBC AUTO-ENTMCNC: 33.3 G/DL (ref 31–36)
MCV RBC AUTO: 91.1 FL (ref 80–100)
MONOCYTES ABSOLUTE: 0.4 K/UL (ref 0–1.3)
MONOCYTES RELATIVE PERCENT: 9.5 %
NEUTROPHILS ABSOLUTE: 2.4 K/UL (ref 1.7–7.7)
NEUTROPHILS RELATIVE PERCENT: 61.8 %
PDW BLD-RTO: 12.9 % (ref 12.4–15.4)
PLATELET # BLD: 162 K/UL (ref 135–450)
PMV BLD AUTO: 10 FL (ref 5–10.5)
POTASSIUM SERPL-SCNC: 4.7 MMOL/L (ref 3.5–5.1)
RBC # BLD: 5.54 M/UL (ref 4.2–5.9)
SODIUM BLD-SCNC: 141 MMOL/L (ref 136–145)
TOTAL PROTEIN: 6.8 G/DL (ref 6.4–8.2)
TRIGL SERPL-MCNC: 72 MG/DL (ref 0–150)
TSH REFLEX: 2.2 UIU/ML (ref 0.27–4.2)
VLDLC SERPL CALC-MCNC: 14 MG/DL
WBC # BLD: 3.8 K/UL (ref 4–11)

## 2022-03-26 LAB
ESTIMATED AVERAGE GLUCOSE: 99.7 MG/DL
HBA1C MFR BLD: 5.1 %

## 2022-04-01 ENCOUNTER — TELEMEDICINE (OUTPATIENT)
Dept: PRIMARY CARE CLINIC | Age: 47
End: 2022-04-01
Payer: COMMERCIAL

## 2022-04-01 DIAGNOSIS — E78.2 MIXED HYPERLIPIDEMIA: Primary | ICD-10-CM

## 2022-04-01 DIAGNOSIS — F41.1 GAD (GENERALIZED ANXIETY DISORDER): ICD-10-CM

## 2022-04-01 PROCEDURE — 99214 OFFICE O/P EST MOD 30 MIN: CPT | Performed by: FAMILY MEDICINE

## 2022-04-01 NOTE — PROGRESS NOTES
2022    TELEHEALTH EVALUATION -- Audio/Visual (During JDAOU-73 public health emergency)    HPI:    Abebe Meyer (:  1975) has requested an audio/video evaluation for the following concern(s):    Questions about results compared to his recent screening at outside lab revealing the following:  Cr 1.15  GFR 77  Discussed trends and answered all questions. Hyperlipidemia  Patient is not following a low fat, low cholesterol diet. is exercising regularly. OTC Supplements: none. Lab Results   Component Value Date    ALT 25 2022    AST 20 2022     Lab Results   Component Value Date    CHOL 212 (H) 2022     Lab Results   Component Value Date    TRIG 72 2022     Lab Results   Component Value Date    HDL 62 (H) 2022     Lab Results   Component Value Date    LDLCALC 136 (H) 2022   ASA: yes  The 10-year ASCVD risk score (Laura Fraser, et al., 2013) is: 1.9%    Values used to calculate the score:      Age: 40 years      Sex: Male      Is Non- : No      Diabetic: No      Tobacco smoker: No      Systolic Blood Pressure: 938 mmHg      Is BP treated: No      HDL Cholesterol: 62 mg/dL      Total Cholesterol: 212 mg/dL    Regarding his mental state, he is doing well on his regimen. Feels more stable. Less nightmares or vivid dreams. Review of Systems   Constitutional: Negative for activity change, chills, fatigue and fever. HENT: Negative for congestion. Eyes: Negative for redness. Respiratory: Negative for cough, chest tightness, shortness of breath and wheezing. Cardiovascular: Negative for chest pain and palpitations. Gastrointestinal: Negative for abdominal pain and diarrhea. Genitourinary: Negative for difficulty urinating. Musculoskeletal: Negative for arthralgias. Skin: Negative for rash. Allergic/Immunologic: Negative for immunocompromised state. Neurological: Negative for dizziness, light-headedness and headaches. Hematological: Negative for adenopathy. Psychiatric/Behavioral: Negative for behavioral problems. Prior to Visit Medications    Medication Sig Taking? Authorizing Provider   buPROPion (WELLBUTRIN XL) 300 MG extended release tablet TAKE 1 TABLET BY MOUTH EVERY DAY IN THE MORNING  Klarissa Garcia MD   testosterone (ANDROGEL; TESTIM) 50 MG/5GM (1%) GEL 1% gel Apply TWO milliliters TO underarm, upper arm, OR shoulder ONCE daily AFTER showering. [facility] HANDS immediately AFTER applying.   Historical Provider, MD   Sildenafil Citrate (VIAGRA PO) Take by mouth  Historical Provider, MD       Social History     Tobacco Use    Smoking status: Former Smoker     Years: 8.     Types: Cigarettes     Quit date:      Years since quittin.2    Smokeless tobacco: Never Used   Substance Use Topics    Alcohol use: Not on file    Drug use: Not on file        No Known Allergies,   Past Medical History:   Diagnosis Date    Anxiety     ED (erectile dysfunction) 2015    Migraines    ,   Past Surgical History:   Procedure Laterality Date    CIRCUMCISION     [address] EXTRACTION     ,   Social History     Tobacco Use    Smoking status: Former Smoker     Years: 8     Types: Cigarettes     Quit date: 2018     Years since quittin.2    Smokeless tobacco: Never Used   Substance Use Topics    Alcohol use: Not on file    Drug use: Not on file   ,   Family History   Problem Relation Age of Onset    Migraines Father     Hypertension Father    ,   Immunization History   Administered Date(s) Administered    COVID-19, Pfizer Purple top, DILUTE for use, 12+ yrs, 30mcg/0.3mL dose 2021, 2021, 2021    Hepatitis A Adult (Havrix, Vaqta) 2007    Hepatitis A/Hepatitis B (Twinrix) 2007, 2007    Hepatitis B 10/12/2007    Influenza A (I7U4-55) Vaccine IM 2009    Influenza Virus Vaccine 2007, 2007, 10/04/2008, 2009, 10/07/2010, 10/01/2011, 09/25/2012, 10/04/2013, 08/28/2021    Influenza, Live, Intranasal, Quadv, (Flumist 2-49 yrs) 10/01/2014, 10/03/2015    Influenza, Todd Starch, IM, PF (6 mo and older Fluzone, Flulaval, Fluarix, and 3 yrs and older Afluria) 08/28/2021    MMR 04/12/2016    Meningococcal MCV4P (Menactra) 02/23/2007    Polio IPV (IPOL) 02/23/2007    Tdap (Boostrix, Adacel) 02/23/2007, 09/25/2012    Typhoid Vac, Parenteral, Other Than Acetone-killed, Dried 10/12/2007    Typhoid Vi capsular polysaccharide (Typhim VI) 09/17/2009   ,   Health Maintenance   Topic Date Due    Hepatitis C screen  Never done    HIV screen  Never done    Colorectal Cancer Screen  Never done    Depression Screen  07/06/2022    DTaP/Tdap/Td vaccine (3 - Td or Tdap) 09/25/2022    Lipid screen  03/25/2027    Flu vaccine  Completed    COVID-19 Vaccine  Completed    Hepatitis A vaccine  Aged Out    Hepatitis B vaccine  Aged Out    Hib vaccine  Aged Out    Meningococcal (ACWY) vaccine  Aged Out    Pneumococcal 0-64 years Vaccine  Aged Out       PHYSICAL EXAMINATION:  [ INSTRUCTIONS:  \"[x]\" Indicates a positive item  \"[]\" Indicates a negative item  -- DELETE ALL ITEMS NOT EXAMINED]  [x] Alert  [x] Oriented to person/place/time    [x] No apparent distress  [] Toxic appearing    [] Face flushed appearing [x] Sclera clear  [] Lips are cyanotic      [x] Breathing appears normal  [] Appears tachypneic      [] Rash on visible skin    [x] Cranial Nerves II-XII grossly intact    [] Motor grossly intact in visible upper extremities    [] Motor grossly intact in visible lower extremities    [x] Normal Mood  [] Anxious appearing    [] Depressed appearing  [] Confused appearing      Due to this being a TeleHealth encounter, evaluation of the following organ systems is limited: Vitals/Constitutional/EENT/Resp/CV/GI//MS/Neuro/Skin/Heme-Lymph-Imm. ASSESSMENT/PLAN:  1.  Mixed hyperlipidemia  Answered all questions   Discussed dietary mgmt and lifestyle mods to incorporate   He is motivated given his fam hx  Rpt lipid panel in one yr to gauge improvement. 2. FRANKLIN (generalized anxiety disorder)  Stable and will continue to monitor  Continue current med regimen for now and follow up in 3 months, sooner if needed  Rx: wellbutrin  mg daily      No follow-ups on file. No future appointments. An  electronic signature was used to authenticate this note. --Hari Balderas MD on 4/6/2022 at 7:57 AM    {Coding Help -- Use CPT 86152-62612 with EM elements or Time rules for Office Visits. :    >20 minutes were spent on the digital evaluation and management of this patient. Pursuant to the emergency declaration under the [facility], [facility]5 waiver authority and the [employer] Act, this Virtual  Visit was conducted, with patient's consent, to reduce the patient's risk of exposure to COVID-19 and provide continuity of care for an established patient. Services were provided through a video synchronous discussion virtually to substitute for in-person clinic visit.

## 2022-04-06 ASSESSMENT — ENCOUNTER SYMPTOMS
EYE REDNESS: 0
ABDOMINAL PAIN: 0
SHORTNESS OF BREATH: 0
DIARRHEA: 0
WHEEZING: 0
COUGH: 0
CHEST TIGHTNESS: 0

## 2022-12-29 ENCOUNTER — E-VISIT (OUTPATIENT)
Dept: PRIMARY CARE CLINIC | Age: 47
End: 2022-12-29
Payer: COMMERCIAL

## 2022-12-29 DIAGNOSIS — U07.1 COVID-19 VIRUS INFECTION: Primary | ICD-10-CM

## 2022-12-29 PROCEDURE — 99422 OL DIG E/M SVC 11-20 MIN: CPT | Performed by: FAMILY MEDICINE

## 2022-12-29 ASSESSMENT — LIFESTYLE VARIABLES
PACKS_PER_DAY: 1
SMOKING_YEARS: 8
SMOKING_STATUS: NO, BUT I USED TO SMOKE

## 2022-12-29 NOTE — E-VISIT ROUTING COMMENT
I spoke with pt he states he no longer smoke he may not pick the medication up from the pharmacy.  I told him that is his choice and I I will let you know

## 2022-12-29 NOTE — PROGRESS NOTES
Cee Jade (:  1975) is a 56 y.o. male,Established patient, here for evaluation of the following chief complaint(s):  COVID-19       ASSESSMENT/PLAN:  1. COVID-19 virus infection  -     nirmatrelvir/ritonavir (PAXLOVID, 300/100,) 20 x 150 MG & 10 x 100MG TBPK; Take 3 tablets (two 150 mg nirmatrelvir and one 100 mg ritonavir tablets) by mouth every 12 hours for 5 days. , Disp-30 tablet, R-0Normal      No follow-ups on file. Subjective   SUBJECTIVE/OBJECTIVE:  HPI  Patient has had a 48-hour history of fever and runny nose associated with a positive COVID test today. Notes he was exposed by visiting family over the holidays. Has a history of smoking and is overweight. Review of Systems       Objective   Physical Exam       On this date 2022 I have spent 12 minutes reviewing previous notes, test results and face to face with the patient discussing the diagnosis and importance of compliance with the treatment plan as well as documenting on the day of the visit.       An electronic signature was used to authenticate this note.    --Odalys Espinoza MD

## 2023-02-12 DIAGNOSIS — F41.1 GAD (GENERALIZED ANXIETY DISORDER): ICD-10-CM

## 2023-02-13 RX ORDER — BUPROPION HYDROCHLORIDE 300 MG/1
TABLET ORAL
Qty: 90 TABLET | Refills: 3 | Status: SHIPPED | OUTPATIENT
Start: 2023-02-13

## 2023-02-13 NOTE — TELEPHONE ENCOUNTER
Medication:   Requested Prescriptions     Pending Prescriptions Disp Refills    buPROPion (WELLBUTRIN XL) 300 MG extended release tablet [Pharmacy Med Name: BUPROPION HCL  MG TABLET] 90 tablet 3     Sig: TAKE 1 TABLET BY MOUTH EVERY DAY IN THE MORNING        Last Filled:  01/28/22    Patient Phone Number: [phone] (home) [phone] (work)    Last appt: 12/29/2022   Next appt: Visit date not found    Last OARRS: No flowsheet data found.

## 2023-10-16 ENCOUNTER — TELEPHONE (OUTPATIENT)
Dept: PRIMARY CARE CLINIC | Age: 48
End: 2023-10-16

## 2023-10-16 NOTE — TELEPHONE ENCOUNTER
----- Message from Rah Pruitt sent at 10/16/2023 10:10 AM EDT -----  Subject: Appointment Request    Reason for Call: New Patient/New to Provider Appointment needed: Routine   Existing Condition Follow Up    QUESTIONS    Reason for appointment request? Available appointments did not meet   patient need     Additional Information for Provider? Patient was with Dr. Ike Estrada   and is now [place] out of [place]. Is asking for a new patient appointment   before the end of the October so that he can get his medications in line   before needing to get a provider in [place]. Please call to advise.    Willing to do a telehealth if possible.  ---------------------------------------------------------------------------  --------------  Arabella Standing INFO  [phone]; OK to leave message on voicemail  ---------------------------------------------------------------------------  --------------  SCRIPT ANSWERS